# Patient Record
Sex: MALE | Race: WHITE | NOT HISPANIC OR LATINO | Employment: OTHER | URBAN - METROPOLITAN AREA
[De-identification: names, ages, dates, MRNs, and addresses within clinical notes are randomized per-mention and may not be internally consistent; named-entity substitution may affect disease eponyms.]

---

## 2024-09-05 ENCOUNTER — DOCUMENTATION ONLY (OUTPATIENT)
Dept: OTHER | Facility: CLINIC | Age: 89
End: 2024-09-05
Payer: COMMERCIAL

## 2024-09-09 ENCOUNTER — NURSING HOME VISIT (OUTPATIENT)
Dept: GERIATRICS | Facility: OTHER | Age: 89
End: 2024-09-09
Payer: COMMERCIAL

## 2024-09-09 ENCOUNTER — LAB REQUISITION (OUTPATIENT)
Dept: LAB | Facility: OTHER | Age: 89
End: 2024-09-09
Payer: COMMERCIAL

## 2024-09-09 DIAGNOSIS — H35.3130 BILATERAL NONEXUDATIVE AGE-RELATED MACULAR DEGENERATION, UNSPECIFIED STAGE: ICD-10-CM

## 2024-09-09 DIAGNOSIS — K55.029 ISCHEMIC NECROSIS OF SMALL BOWEL (H): ICD-10-CM

## 2024-09-09 DIAGNOSIS — K55.029: ICD-10-CM

## 2024-09-09 DIAGNOSIS — J30.2 SEASONAL ALLERGIC RHINITIS, UNSPECIFIED TRIGGER: ICD-10-CM

## 2024-09-09 DIAGNOSIS — I71.43 INFRARENAL ABDOMINAL AORTIC ANEURYSM (AAA) WITHOUT RUPTURE (H): ICD-10-CM

## 2024-09-09 DIAGNOSIS — D62 POSTOPERATIVE ANEMIA DUE TO ACUTE BLOOD LOSS: ICD-10-CM

## 2024-09-09 DIAGNOSIS — Z87.19 HX OF SMALL BOWEL OBSTRUCTION: Primary | ICD-10-CM

## 2024-09-09 DIAGNOSIS — K44.9 HIATAL HERNIA WITHOUT GANGRENE OR OBSTRUCTION: ICD-10-CM

## 2024-09-09 DIAGNOSIS — J69.0 ASPIRATION PNEUMONIA OF BOTH LOWER LOBES DUE TO VOMIT (H): ICD-10-CM

## 2024-09-09 DIAGNOSIS — E78.2 MIXED HYPERLIPIDEMIA: ICD-10-CM

## 2024-09-09 DIAGNOSIS — N17.9 AKI (ACUTE KIDNEY INJURY) (H): ICD-10-CM

## 2024-09-09 DIAGNOSIS — A41.9 SEPSIS, UNSPECIFIED ORGANISM (H): ICD-10-CM

## 2024-09-09 DIAGNOSIS — N17.9 ACUTE KIDNEY FAILURE, UNSPECIFIED (H): ICD-10-CM

## 2024-09-09 LAB
ALBUMIN SERPL BCG-MCNC: 3.3 G/DL (ref 3.5–5.2)
ALP SERPL-CCNC: 131 U/L (ref 40–150)
ALT SERPL W P-5'-P-CCNC: 22 U/L (ref 0–70)
ANION GAP SERPL CALCULATED.3IONS-SCNC: 12 MMOL/L (ref 7–15)
AST SERPL W P-5'-P-CCNC: 34 U/L (ref 0–45)
BASOPHILS # BLD AUTO: 0 10E3/UL (ref 0–0.2)
BASOPHILS NFR BLD AUTO: 1 %
BILIRUB SERPL-MCNC: 0.3 MG/DL
BUN SERPL-MCNC: 19.4 MG/DL (ref 8–23)
CALCIUM SERPL-MCNC: 8.9 MG/DL (ref 8.8–10.4)
CHLORIDE SERPL-SCNC: 104 MMOL/L (ref 98–107)
CREAT SERPL-MCNC: 1.33 MG/DL (ref 0.67–1.17)
EGFRCR SERPLBLD CKD-EPI 2021: 52 ML/MIN/1.73M2
EOSINOPHIL # BLD AUTO: 0.2 10E3/UL (ref 0–0.7)
EOSINOPHIL NFR BLD AUTO: 4 %
ERYTHROCYTE [DISTWIDTH] IN BLOOD BY AUTOMATED COUNT: 13.1 % (ref 10–15)
GLUCOSE SERPL-MCNC: 112 MG/DL (ref 70–99)
HCO3 SERPL-SCNC: 25 MMOL/L (ref 22–29)
HCT VFR BLD AUTO: 33.9 % (ref 40–53)
HGB BLD-MCNC: 11.1 G/DL (ref 13.3–17.7)
IMM GRANULOCYTES # BLD: 0 10E3/UL
IMM GRANULOCYTES NFR BLD: 1 %
LYMPHOCYTES # BLD AUTO: 1.1 10E3/UL (ref 0.8–5.3)
LYMPHOCYTES NFR BLD AUTO: 20 %
MAGNESIUM SERPL-MCNC: 2.1 MG/DL (ref 1.7–2.3)
MCH RBC QN AUTO: 33.4 PG (ref 26.5–33)
MCHC RBC AUTO-ENTMCNC: 32.7 G/DL (ref 31.5–36.5)
MCV RBC AUTO: 102 FL (ref 78–100)
MONOCYTES # BLD AUTO: 0.4 10E3/UL (ref 0–1.3)
MONOCYTES NFR BLD AUTO: 8 %
NEUTROPHILS # BLD AUTO: 3.5 10E3/UL (ref 1.6–8.3)
NEUTROPHILS NFR BLD AUTO: 65 %
NRBC # BLD AUTO: 0 10E3/UL
NRBC BLD AUTO-RTO: 0 /100
PLATELET # BLD AUTO: 362 10E3/UL (ref 150–450)
POTASSIUM SERPL-SCNC: 4.5 MMOL/L (ref 3.4–5.3)
PROT SERPL-MCNC: 6.7 G/DL (ref 6.4–8.3)
RBC # BLD AUTO: 3.32 10E6/UL (ref 4.4–5.9)
SODIUM SERPL-SCNC: 141 MMOL/L (ref 135–145)
WBC # BLD AUTO: 5.3 10E3/UL (ref 4–11)

## 2024-09-09 PROCEDURE — 85025 COMPLETE CBC W/AUTO DIFF WBC: CPT | Performed by: NURSE PRACTITIONER

## 2024-09-09 PROCEDURE — 80053 COMPREHEN METABOLIC PANEL: CPT | Performed by: NURSE PRACTITIONER

## 2024-09-09 PROCEDURE — 83735 ASSAY OF MAGNESIUM: CPT | Performed by: NURSE PRACTITIONER

## 2024-09-09 PROCEDURE — 99418 PROLNG IP/OBS E/M EA 15 MIN: CPT | Performed by: NURSE PRACTITIONER

## 2024-09-09 PROCEDURE — 99310 SBSQ NF CARE HIGH MDM 45: CPT | Performed by: NURSE PRACTITIONER

## 2024-09-09 RX ORDER — DOCUSATE SODIUM 100 MG/1
100 CAPSULE, LIQUID FILLED ORAL DAILY
COMMUNITY
Start: 2024-09-09 | End: 2024-09-17

## 2024-09-09 RX ORDER — CALCIUM CARBONATE 500 MG/1
1 TABLET, CHEWABLE ORAL DAILY
COMMUNITY
Start: 2024-09-09 | End: 2024-09-17

## 2024-09-09 RX ORDER — CHOLECALCIFEROL (VITAMIN D3) 50 MCG
1 TABLET ORAL DAILY
COMMUNITY
Start: 2024-09-09 | End: 2024-09-17

## 2024-09-09 RX ORDER — SENNOSIDES 8.6 MG
650 CAPSULE ORAL 3 TIMES DAILY
COMMUNITY
Start: 2024-09-09 | End: 2024-09-17

## 2024-09-09 RX ORDER — CETIRIZINE HYDROCHLORIDE 5 MG/1
5 TABLET ORAL DAILY PRN
COMMUNITY
Start: 2024-09-09 | End: 2024-09-17

## 2024-09-09 RX ORDER — MULTIVITAMIN
1 TABLET ORAL DAILY
COMMUNITY
Start: 2024-09-09 | End: 2024-09-17

## 2024-09-09 RX ORDER — POLYETHYLENE GLYCOL 3350 17 G/17G
17 POWDER, FOR SOLUTION ORAL DAILY PRN
COMMUNITY
Start: 2024-09-09 | End: 2024-09-17

## 2024-09-09 NOTE — PROGRESS NOTES
[facility]  Hospital follow up/Initial Assessment/transfer of care from [facility]/rehab    Patient Name: Molina Reina   : 1935  MRN: [id number]    Place of Service: [facility]  DOS: 2024    CC: Hospital follow up/Initial Assessment/transfer of care from [facility]    HPI:  Molina Reina is a 89 year old male with PMH of multiple chronic medical concerns, who is seen today regarding pt was hospitalized at [facility] -24 (then discharged to [facility] for rehab on -24) for ischemic bowel/sepsis/small bowel obstruction requiring emergent laparotomy and 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 Pt also has a large hiatal hernia, abdominal aortic aneurysm without rupture.   Pt transferred from [facility] on 24.       Infrarenal AAA (4 cm): did not meet any indication for endovascular repair, recommended routine surveillance outpatient and consider elective surgery   Mixed hyperlipidemia: currently taking statin  Macular degeneration:  Internal hemorrhoids:  Acute post op anemia: Hgb dropped from 14.9 to 9.9.     Pt seen in his room today. He is doing well. Still says his abdomen is tender at times. Only taking tylenol for pain.  He reports having formed/soft bowel movements and had one this morning. He is urinating ok. He is slowly getting an appetite back but doesn't always like the food. He does report he had Telugu toast and sausage this morning.   He is alert and oriented x 3. His goal is to get back home to [place]. He did go home overnight this weekend and stayed at his son's house and he reports this went well.   He has surgery follow up today in [place].   Discussed his AAA and he would like to have a consult with vascular surgery to see what they recommend as he is leaning toward elective surgery but understands it would be best to heal  fully and regain strength from recent bowel surgery first.   He denies cough, chest pain, shortness of breath.     Multidisciplinary notes, laboratory values, medications, vital signs, weight and orders arereviewed from nursing home records. I have reviewed the patient s medical history and updated the computerized patient record.     PMH:  No past medical history on file.    Medications:  Current Outpatient Medications   Medication Sig Dispense Refill    acetaminophen (TYLENOL) 650 MG CR tablet Take 1 tablet (650 mg) by mouth 3 times daily. And q 24 hours PRN pain      calcium carbonate (TUMS) 500 MG chewable tablet Take 1 tablet (500 mg) by mouth daily.      cetirizine (ZYRTEC) 5 MG tablet Take 1 tablet (5 mg) by mouth daily as needed for allergies.      docusate sodium (COLACE) 100 MG capsule Take 1 capsule (100 mg) by mouth daily.      multivitamin (ONE-DAILY) tablet Take 1 tablet by mouth daily.      omeprazole (PRILOSEC) 20 MG DR capsule Take 1 capsule (20 mg) by mouth daily.      polyethylene glycol (MIRALAX) 17 GM/Dose powder Take 17 g by mouth daily as needed for constipation.      psyllium (METAMUCIL/KONSYL) 58.6 % powder Take 6 g (1 teaspoonful) by mouth 2 times daily.      vitamin D3 (CHOLECALCIFEROL) 50 mcg (2000 units) tablet Take 1 tablet (50 mcg) by mouth daily.        Medication reconciliation complete between [facility] record and [facility].     Allergies:  Not on File    Review of Systems:  See HPI    Vital Signs:  Temp 97.3   Pulse 91   Respirations 14   /65   Oxygen Sats 92%   Weight 140.6#    Physical Exam:     Pleasant and alert without distress.    Sclera nonicteric, conjunctiva non-inflamed.  Skin color pink. Mucous membranes moist.   Lungs clear to auscultation throughout. No wheezes or rales noted.   Cardiovascular regular, borderline tachy S1, S2 auscultated. No murmur noted.  Abdomen soft and with mild tenderness, +bowel sounds. Midline surgical incision is healed   Extremities without  edema.     Able to move upper and lower extremities       New Labs/Diagnostics:  8/16/24 labs from [facility]  WBC 8.2-------8/19 WBC 7.8  Hgb 14.9-------8/19 Hgb 9.9  Glucose 170  Na 139-------8/19 Na 141  K 4.2----------8/19 K 4.0  CO2 18  BUN 60--------8/19 BUN 40  Cr 2.37--------8/19 Cr 1.5  GFR 26--------8/19 GFR 45  Calcium 10.1  Mg 2.2  Bili 1.3---------8/19 Bili 1.3  Alk phos 68  ALT less than 6  AST 23  CK 92  Protein 7.5  Albumin 3.8  Lactic acid 4.3    CXR 8/16 from [facility]  Left basilar consolidation, Right basilar opacities. Multilobar pneumonia.       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 2/2 meckel's diverticulum  Plan: follow up labs CBC, CMP today, surgery follow up today    (I71.43) Infrarenal abdominal aortic aneurysm (AAA) without rupture (H24)  Comment: 4cm found on CT scan  Plan: discussed with pt and he would like vascular consult in [place] to discuss at some point if elective surgery would be beneficial    (N17.9) JACINTA (acute kidney injury) (H24)  Comment: improving--last labs found from 8/19.   Plan: will recheck CMP      (H35.4104) Bilateral nonexudative age-related macular degeneration, unspecified stage  Comment: chronic  Plan: supportive cares, multivitamin    (E78.2) Mixed hyperlipidemia  Comment: chronic  Plan[facility] Discussed restarting zocor with patient and he doesn't remember taking this recently and would like to stay off it at this time.     (J69.0) Aspiration pneumonia of both lower lobes due to vomit (H)  Comment: hx prior to surgery when SBO  Plan: resolved, encouraged use of Incentive spirometer    (K44.9) Hiatal hernia without gangrene or obstruction  Comment: large  Plan: monitor    (D62) Postoperative anemia due to acute blood  loss  Comment: Hgb 9.9 last check 8/19  Plan: check CBC    (J30.2) Seasonal allergic rhinitis, unspecified trigger  Comment: stable  Plan: decrease zyrtec to 5 mg (due to age recommendations) and continue PRN allergies    Called daughter and updated on visit today. Questions answered.     A total of 110 minutes spent by me on the date of the encounter doing chart review from [facility] discharge summary and [facility] discharge summary, review of test results, interpretation of tests, review of medications, patient visit, and documentation.    MANI Badillo, CNP ....................  9/9/2024   9:40 AM

## 2024-09-13 ENCOUNTER — TELEPHONE (OUTPATIENT)
Dept: PEDIATRICS | Facility: OTHER | Age: 89
End: 2024-09-13
Payer: COMMERCIAL

## 2024-09-13 NOTE — TELEPHONE ENCOUNTER
Frankie with [facility] requests the discharge paperwork that was faxed to be signed and faxed back as soon as possible as the patient is discharging tomorrow.    Fax ([facility]): [phone]      Okay to leave detailed message.      Luisa Tierney on 9/13/2024 at 10:40 AM

## 2024-09-13 NOTE — TELEPHONE ENCOUNTER
Called Chas to have her fax the papers back to have signed. Myrna Carballo LPN ....................9/13/2024  12:43 PM

## 2024-09-13 NOTE — TELEPHONE ENCOUNTER
[facility] received fax. Myrna Carballo LPN ....................9/13/2024  2:20 PM

## 2024-09-17 ENCOUNTER — TELEPHONE (OUTPATIENT)
Dept: PEDIATRICS | Facility: OTHER | Age: 89
End: 2024-09-17
Payer: COMMERCIAL

## 2024-09-17 DIAGNOSIS — Z87.19 HX OF SMALL BOWEL OBSTRUCTION: ICD-10-CM

## 2024-09-17 DIAGNOSIS — E56.9 VITAMIN DEFICIENCY: Primary | ICD-10-CM

## 2024-09-17 DIAGNOSIS — H35.3130 BILATERAL NONEXUDATIVE AGE-RELATED MACULAR DEGENERATION: ICD-10-CM

## 2024-09-17 DIAGNOSIS — R52 PAIN: ICD-10-CM

## 2024-09-17 DIAGNOSIS — K59.00 CONSTIPATION, UNSPECIFIED CONSTIPATION TYPE: ICD-10-CM

## 2024-09-17 DIAGNOSIS — K44.9 HIATAL HERNIA WITHOUT GANGRENE OR OBSTRUCTION: ICD-10-CM

## 2024-09-17 DIAGNOSIS — J30.2 SEASONAL ALLERGIC RHINITIS: ICD-10-CM

## 2024-09-17 DIAGNOSIS — J30.2 SEASONAL ALLERGIES: ICD-10-CM

## 2024-09-17 RX ORDER — DOCUSATE SODIUM 100 MG/1
100 CAPSULE, LIQUID FILLED ORAL DAILY
Qty: 30 CAPSULE | Refills: 0 | Status: SHIPPED | OUTPATIENT
Start: 2024-09-17

## 2024-09-17 RX ORDER — CETIRIZINE HYDROCHLORIDE 5 MG/1
5 TABLET ORAL DAILY PRN
Qty: 30 TABLET | Refills: 0 | Status: SHIPPED | OUTPATIENT
Start: 2024-09-17

## 2024-09-17 RX ORDER — MULTIVITAMIN
1 TABLET ORAL DAILY
Qty: 30 TABLET | Refills: 0 | Status: SHIPPED | OUTPATIENT
Start: 2024-09-17

## 2024-09-17 RX ORDER — POLYETHYLENE GLYCOL 3350 17 G/17G
17 POWDER, FOR SOLUTION ORAL DAILY PRN
Qty: 510 G | Refills: 0 | Status: SHIPPED | OUTPATIENT
Start: 2024-09-17

## 2024-09-17 RX ORDER — CALCIUM CARBONATE 500 MG/1
1 TABLET, CHEWABLE ORAL DAILY
Qty: 30 TABLET | Refills: 0 | Status: SHIPPED | OUTPATIENT
Start: 2024-09-17

## 2024-09-17 RX ORDER — SENNOSIDES 8.6 MG
650 CAPSULE ORAL 3 TIMES DAILY
Qty: 90 TABLET | Refills: 0 | Status: SHIPPED | OUTPATIENT
Start: 2024-09-17

## 2024-09-17 RX ORDER — CHOLECALCIFEROL (VITAMIN D3) 50 MCG
1 TABLET ORAL DAILY
Qty: 30 TABLET | Refills: 0 | Status: SHIPPED | OUTPATIENT
Start: 2024-09-17

## 2024-09-17 NOTE — TELEPHONE ENCOUNTER
ERIKA called and [facility] wont except  orders for meds for patient to - he is discharged- Patient needs all meds escribed to [facility] in [place]-Any problems call Chas at

## 2024-09-17 NOTE — TELEPHONE ENCOUNTER
I filled these as Dr. Hairston is out for the day.  ROSA CUENCA, MANI CNP on 9/17/2024 at 4:41 PM

## 2024-09-17 NOTE — TELEPHONE ENCOUNTER
Dr. Cleaning,     Patient was discharged from [facility] on 9/14/24. They gave him 2 weeks of his medication but [facility] in [place] did not except their discharge orders for the required 30 day prescriptions he needed. They are all listed in his chart as historical. [facility] cannot fill these as he is discharged. I have sandra'd up the medications Chas gave me for a 30 day supply of each. I have attached a diagnosis for the ones I could find in his past visit notes. Listed are the one's I didn't have information for in the chart.     Per RN at [facility] taking the following for these reasons.     Acetaminophen for pain   Tums did not have a diagnosis   Colace for constipation   Omeprazole for constipation   Miralax for constipation   Psyllium for constipation   Vitamin D3 did not have a diagnosis.     I cannot add the diagnoses to these as they aren't on his problem list.     Patient has an appointment scheduled with you for 10/2/24. Are you able to help with filling these? Please let me know if there is anything else you need.     Elle White RN on 9/17/2024 at 3:56 PM

## 2024-09-20 NOTE — TELEPHONE ENCOUNTER
Prescription refused.  This is a duplicate request.     Requested Prescriptions   Pending Prescriptions Disp Refills    omeprazole (PRILOSEC) 20 MG DR capsule [Pharmacy Med Name: OMEPRAZOLE 20MG CAPSULES] 90 capsule      Sig: TAKE 1 CAPSULE(20 MG) BY MOUTH DAILY     Last Prescription Date:   09/17/2024  Last Fill Qty/Refills:         30, R-1    Svetlana Davis RN on 9/20/2024 at 7:59 AM